# Patient Record
Sex: FEMALE | Race: WHITE | Employment: OTHER | URBAN - METROPOLITAN AREA
[De-identification: names, ages, dates, MRNs, and addresses within clinical notes are randomized per-mention and may not be internally consistent; named-entity substitution may affect disease eponyms.]

---

## 2021-01-07 ENCOUNTER — APPOINTMENT (OUTPATIENT)
Dept: CT IMAGING | Age: 86
End: 2021-01-07
Payer: MEDICARE

## 2021-01-07 ENCOUNTER — HOSPITAL ENCOUNTER (EMERGENCY)
Age: 86
Discharge: HOME | End: 2021-01-07
Payer: MEDICARE

## 2021-01-07 VITALS
BODY MASS INDEX: 27.6 KG/M2 | RESPIRATION RATE: 16 BRPM | WEIGHT: 150 LBS | HEIGHT: 62 IN | TEMPERATURE: 97.6 F | HEART RATE: 95 BPM | DIASTOLIC BLOOD PRESSURE: 75 MMHG | SYSTOLIC BLOOD PRESSURE: 188 MMHG | OXYGEN SATURATION: 96 %

## 2021-01-07 DIAGNOSIS — S32.010A CLOSED COMPRESSION FRACTURE OF BODY OF L1 VERTEBRA (HCC): Primary | ICD-10-CM

## 2021-01-07 PROCEDURE — 99285 EMERGENCY DEPT VISIT HI MDM: CPT

## 2021-01-07 PROCEDURE — 70450 CT HEAD/BRAIN W/O DYE: CPT

## 2021-01-07 PROCEDURE — 6370000000 HC RX 637 (ALT 250 FOR IP): Performed by: PHYSICIAN ASSISTANT

## 2021-01-07 PROCEDURE — 12001 RPR S/N/AX/GEN/TRNK 2.5CM/<: CPT

## 2021-01-07 PROCEDURE — 72125 CT NECK SPINE W/O DYE: CPT

## 2021-01-07 PROCEDURE — 72131 CT LUMBAR SPINE W/O DYE: CPT

## 2021-01-07 RX ORDER — ACETAMINOPHEN 325 MG/1
650 TABLET ORAL ONCE
Status: COMPLETED | OUTPATIENT
Start: 2021-01-07 | End: 2021-01-07

## 2021-01-07 RX ORDER — HYDROCODONE BITARTRATE AND ACETAMINOPHEN 5; 325 MG/1; MG/1
1 TABLET ORAL EVERY 6 HOURS PRN
Qty: 12 TABLET | Refills: 0 | Status: SHIPPED | OUTPATIENT
Start: 2021-01-07 | End: 2021-01-10

## 2021-01-07 RX ORDER — HYDROCODONE BITARTRATE AND ACETAMINOPHEN 5; 325 MG/1; MG/1
1 TABLET ORAL ONCE
Status: DISCONTINUED | OUTPATIENT
Start: 2021-01-07 | End: 2021-01-07 | Stop reason: HOSPADM

## 2021-01-07 RX ADMIN — ACETAMINOPHEN 650 MG: 325 TABLET ORAL at 12:30

## 2021-01-07 ASSESSMENT — ENCOUNTER SYMPTOMS
BACK PAIN: 1
VOMITING: 0
EYE PAIN: 0
COUGH: 0
SHORTNESS OF BREATH: 0
ABDOMINAL PAIN: 0
NAUSEA: 0
SORE THROAT: 0

## 2021-01-07 ASSESSMENT — PAIN SCALES - GENERAL: PAINLEVEL_OUTOF10: 8

## 2021-01-07 ASSESSMENT — PAIN DESCRIPTION - PAIN TYPE: TYPE: ACUTE PAIN

## 2021-01-07 ASSESSMENT — PAIN DESCRIPTION - LOCATION: LOCATION: HEAD

## 2021-01-07 NOTE — ED PROVIDER NOTES
[address] ED  EMERGENCY DEPARTMENT ENCOUNTER        Pt Name: Paco Ayers  MRN: [id number]  [place] 10/28/1929  Date of evaluation: 1/7/2021  Provider: JOAQUIN Murphy  PCP: Letty Pettit MD    PAN. I have evaluated this patient. My supervising physician was available for consultation. CHIEF COMPLAINT       Chief Complaint   Patient presents with    Fall     EMS states pt lost  on door handle and backpedalled down the steps, hit the curb at the bottom and fell onto tailbone, c/o pain in head and tailbone, lac to back of head, denies loc or blood thinner use       HISTORY OF PRESENT ILLNESS   (Location, Timing/Onset, Context/Setting, Quality, Duration, Modifying Factors, Severity, Associated Signs and Symptoms)  Note limiting factors. Paco Ayers is a 80 y.o. female presents to the emergency department following fall. Patient went she was holding onto a door handle loosely, lost her  and fell backwards onto her sacrum and hit the back of her head on the curb. No loss of conscious. Is not on blood thinners. Notes occipital headache, lower back pain. Denies chest pain, shortness of breath,  abdominal pain, nausea, vomiting, numbness, weakness, prodromal symptoms prior to fall. Denies alleviating or aggravating factors to pain. Nursing Notes were all reviewed and agreed with or any disagreements were addressed in the HPI. REVIEW OF SYSTEMS    (2-9 systems for level 4, 10 or more for level 5)     Review of Systems   Constitutional: Negative for fever. HENT: Negative for sore throat. Eyes: Negative for pain and visual disturbance. Respiratory: Negative for cough and shortness of breath. Cardiovascular: Negative for chest pain. Gastrointestinal: Negative for abdominal pain, nausea and vomiting. Genitourinary: Negative for dysuria and frequency. Musculoskeletal: Positive for back pain. Negative for neck pain. Skin: Negative for rash. Neurological: Positive for headaches. Negative for dizziness, weakness and numbness. Psychiatric/Behavioral: Negative for confusion. Positives and Pertinent negatives as per HPI. Except as noted above in the ROS, all other systems were reviewed and negative. PAST MEDICAL HISTORY     Past Medical History:   Diagnosis Date    Hypertension     Osteoarthritis     knees         SURGICAL HISTORY     Past Surgical History:   Procedure Laterality Date    APPENDECTOMY      age 9   [place] DILATION AND CURETTAGE OF UTERUS      OVARY REMOVAL      Left         [address]       Discharge Medication List as of 1/7/2021  3:58 PM      CONTINUE these medications which have NOT CHANGED    Details   Multiple Vitamins-Minerals (OCUVITE ADULT 50+ PO) Take 1 tablet by mouth dailyHistorical Med      lisinopril-hydrochlorothiazide (PRINZIDE;ZESTORETIC) 10-12.5 MG per tablet Take 1 tablet by mouth daily. , Disp-90 tablet, R-3Normal      docusate sodium (COLACE) 100 MG capsule TAKE ONE CAPSULE BY MOUTH TWICE A DAY, Disp-60 capsule, R-5      Calcium Carbonate-Vitamin D (OSCAL 500/200 D-3 PO) Take  by mouth 2 times daily. aspirin 81 MG EC tablet Take 81 mg by mouth daily. ALLERGIES     Sulfa antibiotics    FAMILYHISTORY     History reviewed. No pertinent family history. SOCIAL HISTORY       Social History     Tobacco Use    Smoking status: Never Smoker    Smokeless tobacco: Never Used   Substance Use Topics    Alcohol use: No    Drug use: No       SCREENINGS    [place] Coma Scale  Eye Opening: Spontaneous  Best Verbal Response: Oriented  Best Motor Response: Obeys commands  [place] Coma Scale Score: 15        PHYSICAL EXAM    (up to 7 for level 4, 8 or more for level 5)     ED Triage Vitals [01/07/21 1204]   BP Temp Temp Source Pulse Resp SpO2 Height Weight   (!) 146/97 97.6 °F (36.4 °C) Oral 81 16 99 % 5' 2\" (1.575 m) 150 lb (68 kg)       Physical Exam  Vitals signs reviewed. Constitutional:       Appearance: She is not diaphoretic. HENT:      Head: Normocephalic. Comments: Occipital scalp laceration. Right Ear: Tympanic membrane, ear canal and external ear normal. There is no impacted cerumen. Left Ear: Tympanic membrane, ear canal and external ear normal. There is no impacted cerumen. Nose: No congestion or rhinorrhea. Mouth/Throat:      Mouth: Mucous membranes are moist.      Pharynx: Oropharynx is clear. No oropharyngeal exudate or posterior oropharyngeal erythema. Eyes:      General: No scleral icterus. Extraocular Movements: Extraocular movements intact. Conjunctiva/sclera: Conjunctivae normal.      Pupils: Pupils are equal, round, and reactive to light. Neck:      Musculoskeletal: Normal range of motion and neck supple. No neck rigidity or muscular tenderness. Cardiovascular:      Rate and Rhythm: Normal rate and regular rhythm. Pulses: Normal pulses. Heart sounds: Normal heart sounds. No murmur. No friction rub. No gallop. Pulmonary:      Effort: Pulmonary effort is normal. No respiratory distress. Breath sounds: Normal breath sounds. No stridor. No wheezing, rhonchi or rales. Abdominal:      General: There is no distension. Palpations: Abdomen is soft. Tenderness: There is no abdominal tenderness. There is no right CVA tenderness, left CVA tenderness, guarding or rebound. Musculoskeletal: Normal range of motion. Comments: Point tender palpation of midline lumbar spine. Otherwise no tenderness or injury to cervical, thoracic, upper or lower extremities. Skin:     General: Skin is warm and dry. Neurological:      General: No focal deficit present. Mental Status: She is alert and oriented to person, place, and time. Cranial Nerves: No cranial nerve deficit. Sensory: No sensory deficit. Motor: No weakness.       Coordination: Coordination normal.      Gait: Gait normal. Psychiatric:         Mood and Affect: Mood normal.         Behavior: Behavior normal.         DIAGNOSTIC RESULTS   LABS:    Labs Reviewed - No data to display    All other labs were within normal range or not returned as of this dictation. EKG: All EKG's are interpreted by the Emergency Department Physician in the absence of a cardiologist.  Please see their note for interpretation of EKG. RADIOLOGY:   Non-plain film images such as CT, Ultrasound and MRI are read by the radiologist. Plain radiographic images are visualized and preliminarily interpreted by the ED Provider with the below findings:        Interpretation per the Radiologist below, if available at the time of this note:    CT Lumbar Spine WO Contrast   Final Result   Anterior superior corner fracture L1 consistent with compression deformity. There is minimal height loss. CT Cervical Spine WO Contrast   Final Result   No acute abnormality of the cervical spine. CT Head WO Contrast   Final Result   1. No acute intracranial abnormality.            Ct Head Wo Contrast    Result Date: 1/7/2021 EXAMINATION: CT OF THE HEAD WITHOUT CONTRAST  1/7/2021 1:43 pm TECHNIQUE: CT of the head was performed without the administration of intravenous contrast. Dose modulation, iterative reconstruction, and/or weight based adjustment of the mA/kV was utilized to reduce the radiation dose to as low as reasonably achievable. COMPARISON: None. HISTORY: ORDERING SYSTEM PROVIDED HISTORY: Fall TECHNOLOGIST PROVIDED HISTORY: Reason for exam:->Fall Has a \"code stroke\" or \"stroke alert\" been called? ->No Reason for Exam: Fall (EMS states pt lost  on door handle and backpedalled down the steps, hit the curb at the bottom and fell onto tailbone, c/o pain in head and tailbone, lac to back of head, denies loc or blood thinner use) Acuity: Acute Type of Exam: Initial FINDINGS: BRAIN/VENTRICLES: There is no acute intracerebral hemorrhage or extra-axial fluid collection. There is mild cerebral atrophy with moderate periventricular, subcortical and deep white matter small vessel ischemic disease. ORBITS: Status post bilateral cataract removal.  There is a dysconjugate gaze. SINUSES: The visualized paranasal sinuses and mastoid air cells are clear. SOFT TISSUES/SKULL:  The calvarium is intact. There is a small posterior scalp laceration. 1. No acute intracranial abnormality.      Ct Cervical Spine Wo Contrast    Result Date: 1/7/2021 EXAMINATION: CT OF THE CERVICAL SPINE WITHOUT CONTRAST 1/7/2021 1:43 pm TECHNIQUE: CT of the cervical spine was performed without the administration of intravenous contrast. Multiplanar reformatted images are provided for review. Dose modulation, iterative reconstruction, and/or weight based adjustment of the mA/kV was utilized to reduce the radiation dose to as low as reasonably achievable. COMPARISON: None. HISTORY: ORDERING SYSTEM PROVIDED HISTORY: Fall TECHNOLOGIST PROVIDED HISTORY: Reason for exam:->Fall Reason for Exam: Fall (EMS states pt lost  on door handle and backpedalled down the steps, hit the curb at the bottom and fell onto tailbone, c/o pain in head and tailbone, lac to back of head, denies loc or blood thinner use) Acuity: Acute Type of Exam: Initial FINDINGS: BONES/ALIGNMENT: There is no acute fracture or traumatic malalignment. Mild anterolisthesis C3 on C4 and C7 on T1 appear degenerative in etiology. DEGENERATIVE CHANGES: Multilevel degenerative changes. SOFT TISSUES: There is no prevertebral soft tissue swelling. No acute abnormality of the cervical spine.      Ct Lumbar Spine Wo Contrast    Result Date: 1/7/2021 EXAMINATION: CT OF THE LUMBAR SPINE WITHOUT CONTRAST  1/7/2021 TECHNIQUE: CT of the lumbar spine was performed without the administration of intravenous contrast. Multiplanar reformatted images are provided for review. Dose modulation, iterative reconstruction, and/or weight based adjustment of the mA/kV was utilized to reduce the radiation dose to as low as reasonably achievable. COMPARISON: None HISTORY: ORDERING SYSTEM PROVIDED HISTORY: Fall TECHNOLOGIST PROVIDED HISTORY: Reason for exam:->Fall Reason for Exam: Fall (EMS states pt lost  on door handle and backpedalled down the steps, hit the curb at the bottom and fell onto tailbone, c/o pain in head and tailbone, lac to back of head, denies loc or blood thinner use) Acuity: Acute Type of Exam: Initial FINDINGS: BONES/ALIGNMENT: There is a nondisplaced anterior superior endplate fracture at L1 with minimal height loss. DEGENERATIVE CHANGES: There significant multilevel discogenic degenerative changes which are incompletely evaluated on CT imaging. There is no gross bony central canal or foraminal stenosis. SOFT TISSUES/RETROPERITONEUM: No paraspinal mass is seen. Anterior superior corner fracture L1 consistent with compression deformity. There is minimal height loss.            PROCEDURES   Unless otherwise noted below, none     Lac Repair    Date/Time: 1/7/2021 6:19 PM  Performed by: JOAQUIN Bergeron  Authorized by: JOAQUIN Bergeron     Consent:     Consent obtained:  Verbal    Consent given by:  Patient  Laceration details:     Location:  Scalp    Scalp location:  Occipital    Length (cm):  1    Depth (mm):  2  Repair type:     Repair type:  Simple  Pre-procedure details:     Preparation:  Patient was prepped and draped in usual sterile fashion and imaging obtained to evaluate for foreign bodies  Exploration:     Hemostasis achieved with:  Direct pressure Wound exploration: wound explored through full range of motion and entire depth of wound probed and visualized      Wound extent: no areolar tissue violation noted, no fascia violation noted, no foreign bodies/material noted, no muscle damage noted, no nerve damage noted, no tendon damage noted, no underlying fracture noted and no vascular damage noted      Contaminated: no    Treatment:     Wound cleansed with: Chloraprep. Visualized foreign bodies/material removed: no    Skin repair:     Repair method:  Staples    Number of staples:  2  Approximation:     Approximation:  Close  Post-procedure details:     Dressing:  Open (no dressing)    Patient tolerance of procedure:   Tolerated well, no immediate complications        CRITICAL CARE TIME   N/A    CONSULTS:  None      EMERGENCY DEPARTMENT COURSE and DIFFERENTIAL DIAGNOSIS/MDM:   Vitals:    Vitals:    01/07/21 1309 01/07/21 1406 01/07/21 1436 01/07/21 1558   BP: (!) 160/78 (!) 151/56 (!) 157/64 (!) 188/75   Pulse:    95   Resp:    16   Temp:       TempSrc:       SpO2: 98% 97% 97% 96%   Weight:       Height:           Patient was given the following medications:  Medications   acetaminophen (TYLENOL) tablet 650 mg (650 mg Oral Given 1/7/21 1230) 80-year-old female presents emergency room following mechanical fall. CT head, cervical spine, lumbar spine ordered. CT lumbar spine notable for L1 compression fracture with minimal height loss. No neurological deficit. No intracranial bleeding or cervical injury on CT imaging. Laceration repair was stable as per procedure note. LSO brace provided. No neurological deficits throughout her stay. Appropriate for discharge with outpatient follow-up. Instructed to follow-up with primary care provider within 1 week. Also given referral information for [facility], recommended spine surgery follow-up ideally within 1 week. Instructed to return the emergency room for new or worsening symptoms including but not limited to numbness, weakness, bowel or bladder incontinence, inability urinate for more than 6 h, numbness, weakness, fever, chest pain, shortness of breath, any other symptoms she is concerned about. Verbal and written discharge instructions and return precautions given. FINAL IMPRESSION      1. Closed compression fracture of body of L1 vertebra [facility])          DISPOSITION/PLAN   DISPOSITION Decision To Discharge 01/07/2021 03:58:06 PM      PATIENT REFERREDTO:  Heather Hardy MD  [address]  [phone]    Call in 1 day  Call to schedule primary care follow-up, ideally to be seen within 1 week. [address]  [phone]  Call in 1 day  Call to schedule spine surgery follow-up, ideally to be seen within 1 week. DISCHARGE MEDICATIONS:  Discharge Medication List as of 1/7/2021  3:58 PM      START taking these medications    Details   HYDROcodone-acetaminophen (NORCO) 5-325 MG per tablet Take 1 tablet by mouth every 6 hours as needed for Pain for up to 3 days. Intended supply: 3 days.  Take lowest dose possible to manage pain, Disp-12 tablet, R-0Normal DISCONTINUED MEDICATIONS:  Discharge Medication List as of 1/7/2021  3:58 PM      STOP taking these medications       multivitamin (THERAGRAN) per tablet Comments:   Reason for Stopping:                      (Please note that portions of this note were completed with a voice recognition program.  Efforts were made to edit the dictations but occasionally words are mis-transcribed.)    JOAQUIN Cornell (electronically signed)         JOAQUIN Cornell  01/07/21 9912

## 2021-01-07 NOTE — ED NOTES
Patient repositioned for comfort and now states she does not need any further pain medication. Will continue to monitor.      Ricky Mcnulty RN  01/07/21 9755

## 2021-02-04 ENCOUNTER — HOSPITAL ENCOUNTER (OUTPATIENT)
Age: 86
Setting detail: SPECIMEN
Discharge: HOME | End: 2021-02-04
Payer: MEDICARE

## 2021-02-04 LAB
A/G RATIO: 1.7 (ref 1.1–2.2)
ALBUMIN SERPL-MCNC: 4.3 G/DL (ref 3.4–5)
ALP BLD-CCNC: 91 U/L (ref 40–129)
ALT SERPL-CCNC: 9 U/L (ref 10–40)
ANION GAP SERPL CALCULATED.3IONS-SCNC: 12 MMOL/L (ref 3–16)
AST SERPL-CCNC: 14 U/L (ref 15–37)
BASOPHILS ABSOLUTE: 0 K/UL (ref 0–0.2)
BASOPHILS RELATIVE PERCENT: 0.7 %
BILIRUB SERPL-MCNC: 0.3 MG/DL (ref 0–1)
BILIRUBIN URINE: NEGATIVE
BLOOD, URINE: NEGATIVE
BUN BLDV-MCNC: 16 MG/DL (ref 7–20)
CALCIUM SERPL-MCNC: 9.7 MG/DL (ref 8.3–10.6)
CHLORIDE BLD-SCNC: 98 MMOL/L (ref 99–110)
CLARITY: CLEAR
CO2: 22 MMOL/L (ref 21–32)
COLOR: ABNORMAL
CREAT SERPL-MCNC: 0.9 MG/DL (ref 0.6–1.2)
EOSINOPHILS ABSOLUTE: 0.2 K/UL (ref 0–0.6)
EOSINOPHILS RELATIVE PERCENT: 3.1 %
EPITHELIAL CELLS, UA: NORMAL /HPF (ref 0–5)
GFR AFRICAN AMERICAN: >60
GFR NON-AFRICAN AMERICAN: 59
GLOBULIN: 2.5 G/DL
GLUCOSE BLD-MCNC: 141 MG/DL (ref 70–99)
GLUCOSE URINE: NEGATIVE MG/DL
HCT VFR BLD CALC: 31.3 % (ref 36–48)
HEMOGLOBIN: 10.5 G/DL (ref 12–16)
KETONES, URINE: NEGATIVE MG/DL
LEUKOCYTE ESTERASE, URINE: ABNORMAL
LYMPHOCYTES ABSOLUTE: 1.2 K/UL (ref 1–5.1)
LYMPHOCYTES RELATIVE PERCENT: 19.7 %
MCH RBC QN AUTO: 30.7 PG (ref 26–34)
MCHC RBC AUTO-ENTMCNC: 33.5 G/DL (ref 31–36)
MCV RBC AUTO: 91.5 FL (ref 80–100)
MICROSCOPIC EXAMINATION: YES
MONOCYTES ABSOLUTE: 0.5 K/UL (ref 0–1.3)
MONOCYTES RELATIVE PERCENT: 9 %
NEUTROPHILS ABSOLUTE: 4.1 K/UL (ref 1.7–7.7)
NEUTROPHILS RELATIVE PERCENT: 67.5 %
NITRITE, URINE: NEGATIVE
PDW BLD-RTO: 13.6 % (ref 12.4–15.4)
PH UA: 7 (ref 5–8)
PLATELET # BLD: 259 K/UL (ref 135–450)
PMV BLD AUTO: 8.6 FL (ref 5–10.5)
POTASSIUM SERPL-SCNC: 4.3 MMOL/L (ref 3.5–5.1)
PROTEIN UA: NEGATIVE MG/DL
RBC # BLD: 3.42 M/UL (ref 4–5.2)
RBC UA: NORMAL /HPF (ref 0–4)
RENAL EPITHELIAL, UA: NORMAL /HPF (ref 0–1)
SODIUM BLD-SCNC: 132 MMOL/L (ref 136–145)
SPECIFIC GRAVITY UA: 1.01 (ref 1–1.03)
TOTAL PROTEIN: 6.8 G/DL (ref 6.4–8.2)
URINE TYPE: ABNORMAL
UROBILINOGEN, URINE: 0.2 E.U./DL
WBC # BLD: 6 K/UL (ref 4–11)
WBC UA: NORMAL /HPF (ref 0–5)

## 2021-02-04 PROCEDURE — 36415 COLL VENOUS BLD VENIPUNCTURE: CPT

## 2021-02-04 PROCEDURE — 81001 URINALYSIS AUTO W/SCOPE: CPT

## 2021-02-04 PROCEDURE — 87086 URINE CULTURE/COLONY COUNT: CPT

## 2021-02-04 PROCEDURE — 85025 COMPLETE CBC W/AUTO DIFF WBC: CPT

## 2021-02-04 PROCEDURE — 80053 COMPREHEN METABOLIC PANEL: CPT

## 2021-02-05 LAB — URINE CULTURE, ROUTINE: NORMAL
